# Patient Record
Sex: FEMALE | Race: AMERICAN INDIAN OR ALASKA NATIVE | URBAN - NONMETROPOLITAN AREA
[De-identification: names, ages, dates, MRNs, and addresses within clinical notes are randomized per-mention and may not be internally consistent; named-entity substitution may affect disease eponyms.]

---

## 2022-10-18 ENCOUNTER — OFFICE VISIT (OUTPATIENT)
Dept: URBAN - NONMETROPOLITAN AREA CLINIC 13 | Facility: CLINIC | Age: 62
End: 2022-10-18
Payer: COMMERCIAL

## 2022-10-18 DIAGNOSIS — H52.4 PRESBYOPIA: Primary | ICD-10-CM

## 2022-10-18 DIAGNOSIS — H35.342 MACULAR CYST, HOLE, OR PSEUDOHOLE, LEFT EYE: ICD-10-CM

## 2022-10-18 PROCEDURE — 92002 INTRM OPH EXAM NEW PATIENT: CPT | Performed by: OPTOMETRIST

## 2022-10-18 ASSESSMENT — INTRAOCULAR PRESSURE
OD: 10
OS: 9

## 2022-10-18 ASSESSMENT — VISUAL ACUITY
OD: 20/20
OS: 20/600

## 2022-10-18 NOTE — IMPRESSION/PLAN
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  Plan: pt will follow up with retina in [facility]

## 2025-02-17 ENCOUNTER — OFFICE VISIT (OUTPATIENT)
Dept: URBAN - NONMETROPOLITAN AREA CLINIC 13 | Facility: CLINIC | Age: 65
End: 2025-02-17
Payer: OTHER GOVERNMENT

## 2025-02-17 DIAGNOSIS — H35.3221 EXUDATIVE MACULAR DEGENERATION, WITH ACTIVE CHOROIDAL NEOVASCULARIZATION, LEFT EYE: ICD-10-CM

## 2025-02-17 DIAGNOSIS — H43.811 VITREOUS DEGENERATION, RIGHT EYE: ICD-10-CM

## 2025-02-17 DIAGNOSIS — H52.4 PRESBYOPIA: Primary | ICD-10-CM

## 2025-02-17 DIAGNOSIS — H25.813 COMBINED FORMS OF AGE-RELATED CATARACT, BILATERAL: ICD-10-CM

## 2025-02-17 PROCEDURE — 99213 OFFICE O/P EST LOW 20 MIN: CPT | Performed by: OPTOMETRIST

## 2025-02-17 ASSESSMENT — VISUAL ACUITY
OD: 20/20
OS: 20/80

## 2025-02-17 ASSESSMENT — INTRAOCULAR PRESSURE
OD: 11
OS: 11